# Patient Record
Sex: MALE | Race: WHITE
[De-identification: names, ages, dates, MRNs, and addresses within clinical notes are randomized per-mention and may not be internally consistent; named-entity substitution may affect disease eponyms.]

---

## 2020-03-15 ENCOUNTER — HOSPITAL ENCOUNTER (EMERGENCY)
Dept: HOSPITAL 25 - UCCORT | Age: 75
Discharge: HOME | End: 2020-03-15
Payer: MEDICARE

## 2020-03-15 VITALS — SYSTOLIC BLOOD PRESSURE: 110 MMHG | DIASTOLIC BLOOD PRESSURE: 72 MMHG

## 2020-03-15 DIAGNOSIS — N40.0: ICD-10-CM

## 2020-03-15 DIAGNOSIS — J40: Primary | ICD-10-CM

## 2020-03-15 DIAGNOSIS — Z79.899: ICD-10-CM

## 2020-03-15 PROCEDURE — G0463 HOSPITAL OUTPT CLINIC VISIT: HCPCS

## 2020-03-15 PROCEDURE — 71046 X-RAY EXAM CHEST 2 VIEWS: CPT

## 2020-03-15 PROCEDURE — 99202 OFFICE O/P NEW SF 15 MIN: CPT

## 2020-03-15 NOTE — XMS REPORT
Continuity of Care Document (CCD)

 Created on:2020



Patient:Gaurav Oconnor Jr

Sex:Male

:1945

External Reference #:[id number]





Demographics







 Address  [address] [place] [address]

 

 Home Phone  [phone]

 

 Mobile Phone  [phone]

 

 Preferred Language  en

 

 Marital Status  Not  or 

 

 [employer] Affiliation  Unknown

 

 Race  White

 

 Ethnic Group  Not  or 









Author







 Name  Laboratory (transmitted by agent of provider Ashley Crisostomo)

 

 Address  [address] [place] [address], [address]



   [place] [address]









Care Team Providers







 Name  Role  Phone

 

 ANTONIO Mojica M.D. -  Care Team Information   [phone]
[phone]



 Ophthalmology    

 

 Anais Miranda M.S.F.N.P. - Nurse  Care Team Information   [phone]
[phone]



 Practitioner    

 

 Cordell Payan DO - Urology  Care Team Information   [phone]
[phone]









Problems







 Active Problems  Provider  Date

 

 Benign prostatic hypertrophy without outflow  Kervin Lovell M.D.  Onset: 



 obstruction    

 

 Impaired fasting glycaemia  Kervin Lovell M.D.  Onset: 2015

 

 Mild cognitive disorder  Kervin Lovell M.D.  Onset: 09/10/2019

 

 Screening for malignant neoplasm of prostate  Kervin Lovell M.D.  Onset: 09/10
/2019







Social History







 Type  Date  Description  Comments

 

 Birth Sex    Unknown  

 

 Tobacco Use  Start: Unknown  Never Smoked Cigarettes  

 

 ETOH Use    Occasionally consumes alcohol  

 

 Recreational Drug Use    Denies Drug Use  

 

 Tobacco Use  Start: Unknown  Patient has never smoked  

 

 Smoking Status  Reviewed: 09/10/19  Patient has never smoked  







Allergies, Adverse Reactions, Alerts







 Active Allergies  Reaction  Severity  Comments  Date

 

 NKDA        2015

 

 Ragweed    Moderate  Sneezing, burning eyes, childhoold  2013



       allergy  







Medications







 Active Medications  SIG  Qnty  Indications  Ordering Provider  Date

 

 Prednisone  3 po qd for 2  13tabs  R05  Kervin Lovell,  2020



          10mg Tablets  days, then 2 po      M.D.  



   q for 2 days,        



   then 1 po qd for        



   2 days, then 1/2        



   po q for 2        



   days,off        

 

 Galantamine  1 po bid (dose  60tabs  G31.84  Kervin Lovell,  09/10/2019



 Hydrobromide  increase)      M.D.  



            8mg          



 Tablets          



           

 

 Acidophilus  take 1 tablet by  60caps    Kervin Lovell,  2019



            Capsules  mouth once daily      M.D.  



           

 

 Tamsulosin HCL  1 by mouth every  90caps  N40.0  Kervin Lovell,  2019



              0.4mg  day      M.D.  



 Capsules          



           

 

 Finasteride  Take One Tablet  90tabs  N40.0  Kervin Lovell,  2013



           5mg Tablets  By Mouth Every      M.D.  



   Day        

 

 Vitamin D3  1 po qd      [facility]  10/19/2012



          2000Unit        Osika-Michales,  



 Tablets        R.N., F.N.P.  



           

 

 Multi Vitamin Mens  1 tab PO Q day      [facility]  10/17/2012



         Osika-Michales,  



 Tablets        R.N., F.N.P.  



           

 

 Aspirin  1 po qd  30units    [facility]  10/17/2012



       325mg Chewtabs        Osika-Michales,  



         R.N., F.N.P.  

 

 Melatonin  1 by mouth every      Unknown  



         5mg Capsules  night at bedtime        



   as needed        









 History Medications









 Azithromycin  2 tabs by mouth  6tabs  R05  Nitza Salcido,  10/11/2019 -



          250mg Tablets  x1 day, 1 tab      RN, VIRAJ  2020



   by mouth x4        



   days        







Medications Administered in Office







 Medication  SIG  Qnty  Indications  Ordering Provider  Date

 

 Inj, Kenalog, Triamcinolone        Melecio Quintanilla DO  2016



 Acetonide Per 10 MG, NDC          



 6220-3418-27          



   Injection          

 

 Admin Of Vaccine,One Vaccine        Injection/BP Schedule  10/23/2015



                   Injection          



           

 

 Admin Of Pneumococcal Vaccine        Injection/BP Schedule  2015



                    Injection          



           

 

 Admin Of Pneumococcal Vaccine        Kervin Lovell M.D.  2014



                    Injection          



           

 

 Admin Of Vaccine,One Vaccine        Kervin Lovell M.D.  2009



                   Injection          



           







Immunizations







 CPT Code  Status  Date  Vaccine  Lot #

 

 [id number]  Given  09/10/2019  Fluad 0.5ML, Ages 65Yrs And Older,Flu Vaccine,NDC  
[id number]



       08230-209-63  

 

 [id number]  Given  10/19/2018  Fluad 0.5ML, Ages 65Yrs And Older,Flu Vaccine,NDC  



       97518-899-81  

 

 99370  Given  2017  Flucelvax Quad, 0.5mL, NDC [address]-81  [id number]

 

 69673  Given  11/10/2016  Afluria, 0.5mL Flu Vaccine  

 

 58264  Given  10/23/2015  Tdap, adacel vaccine NDC 49281-400-10 .50ML  [id number]

 

 79789  Given  2015  Prevnar 13, NDC 4558-7459-58. 0.5 ML  

 

 28120  Given  2014  Pneumococcal Vaccine, NDC 3909-7154-52, 0.5 ML  
[id number]

 

 33204  Given  2009  Tetanus&Diptheria Toxoid Immunization(deleted  



       )  







Vital Signs







 Date  Vital  Result  Comment

 

 2020  4:02pm  Weight  176.00 lb  









 Height  67.75 inches  5'7.75"

 

 BP Systolic  140 mmHg  

 

 BP Diastolic  80 mmHg  

 

 Body Temperature  99.9 F  

 

 Heart Rate  80 /min  

 

 Respiratory Rate  18 /min  

 

 BMI (Body Mass Index)  27.0 kg/m2  









 10/11/2019  4:05pm  Weight  173.38 lb  









 Height  67.75 inches  5'7.75"

 

 BP Systolic  124 mmHg  

 

 BP Diastolic  72 mmHg  

 

 Body Temperature  98.3 F  

 

 Heart Rate  64 /min  

 

 O2 % BldC Oximetry  98 %  

 

 BMI (Body Mass Index)  26.6 kg/m2  







Results







 Test  Acquired Date  Facility  Test  Result  H/L  Range  Note

 

 Influenza A And  2020  In Office Labs  Influenza A  POSITIVE    Negative
  



 B (Inoffice)      Rapid        









 Influenza B Rapid Test  NEGATIVE    Negative  







Procedures







 Date  Code  Description  Status

 

 2016  92702619  Colonoscopy  Completed

 

 2005  55252959  Colonoscopy  Completed







Medical Devices







 Description

 

 No Information Available







Encounters







 Type  Date  Location  Provider  Dx  Diagnosis

 

 Office Visit  2020  Main Office  Chronic Care  N40.0  Benign prostatic



   1:00p    Schedule #2    hyperplasia without



           lower urinry tract



           symp









 R73.01  Impaired fasting glucose









 Office Visit  2020  1:00p  Main Office  Chronic Care  N40.0  Benign 
prostatic



       Schedule #2    hyperplasia without



           lower urinry tract



           symp









 R73.01  Impaired fasting glucose









 Office Visit  2020  4:00p  Main Office  Kervin Lovell M.D.  R05  Cough









 R50.9  Fever, unspecified

 

 J10.1  Flu due to oth ident influenza virus w oth resp manifest









 Office Visit  10/21/2019  1:30p  Main Office  Chronic Care  N40.0  Benign 
prostatic



       Schedule #2    hyperplasia without



           lower urinry tract



           symp









 E78.2  Mixed hyperlipidemia

 

 R73.01  Impaired fasting glucose









 Office Visit  10/11/2019  4:00p  Main Office  Nitza Salcido RN, FNP  R05  
Cough









 Z71.3  Dietary counseling and surveillance









 Office Visit  2019  1:30p  Main Office  Chronic Care  N40.0  Benign 
prostatic



       Schedule #2    hyperplasia without



           lower urinry tract



           symp









 R73.01  Impaired fasting glucose

 

 E78.2  Mixed hyperlipidemia







Assessments







 Date  Code  Description  Provider

 

 2020  N40.0  Benign prostatic hyperplasia without lower  Chronic Care 
Schedule #2



     urinary tract sym  

 

 2020  R73.01  Impaired fasting glucose  Chronic Care Schedule #2

 

 2020  N40.0  Benign prostatic hyperplasia without lower  Chronic Care 
Schedule #2



     urinary tract sym  

 

 2020  R73.01  Impaired fasting glucose  Chronic Care Schedule #2

 

 2020  R05  Cough  Laboratory

 

 2020  R05  Ruth Lovell M.D.

 

 2020  R50.9  Fever, unspecified  Laboratory

 

 2020  R50.9  Fever, unspecified  Kervin Lovell M.D.

 

 2020  J10.1  Influenza due to other identified  Laboratory



     influenza virus with other respiratory  



     manifestations  

 

 2020  J10.1  Influenza due to other identified  Kervin Lovell M.D.



     influenza virus with other respiratory  



     manifestations  

 

 10/21/2019  N40.0  Benign prostatic hyperplasia without lower  Chronic Care 
Schedule #2



     urinary tract sym  

 

 10/21/2019  E78.2  Mixed hyperlipidemia  Chronic Care Schedule #2

 

 10/21/2019  R73.01  Impaired fasting glucose  Chronic Care Schedule #2

 

 10/11/2019  R05  Cough  Nitza Salcido RN, FNP

 

 10/11/2019  Z71.3  Dietary counseling and surveillance  Nitza Salcido RN, FRITZP

 

 2019  N40.0  Benign prostatic hyperplasia without lower  Chronic Care 
Schedule #2



     urinary tract sym  

 

 2019  R73.01  Impaired fasting glucose  Chronic Care Schedule #2

 

 2019  E78.2  Mixed hyperlipidemia  Chronic Care Schedule #2







Plan of Treatment

Future Appointment(s):2020  4:30 pm - Kervin Lovell M.D. at Main [id number]  1:00 pm - Chronic Care Schedule #2 at Main Qtcgid99/15/2020  4:30 pm 
- Kervin Lovell M.D. at Main [id number] - Kervin Lovell M.D.R05 
CoughNew Medication:Prednisone 10 mg - 3 po qd for 2 days, then 2 po q for 2 
days, then 1 po qd for 2 days, then 1/2 po q for 2 days,offR50.9 Fever, 
mkczkztmhhbP50.1 Influenza due to other identified influenza virus with other 
respiratory manifestations



Functional Status







 Description

 

 No Information Available







Mental Status







 Description

 

 No Information Available







Referrals







 Description

 

 No Information Available

## 2020-03-15 NOTE — UC
Throat Pain/Nasal Taz HPI





- HPI Summary


HPI Summary: 


74-year-old male comes in with a chief complaint of cough chest congestion and 

fatigue.  Patient reports that he had influenza 70 weeks ago when he was quite 

ill during that period.  Since that time he doesn't feel like he's got back to 

100% of health.  5 days ago patient started feeling ill again and tightness in 

the chest which reminded him of influenza.  He does have some sputum production.





- History of Current Complaint


Chief Complaint: UCRespiratory


Stated Complaint: COUGH, CONGESTION


Time Seen by Provider: 03/15/20 10:07


Pain Intensity: 0





- Allergies/Home Medications


Allergies/Adverse Reactions: 


 Allergies











Allergy/AdvReac Type Severity Reaction Status Date / Time


 


No Known Allergies Allergy   Verified 03/15/20 10:18











Home Medications: 


 Home Medications





Azithromyxin NAVEEN (NF) [Z-Naveen (Zithromax) 250 mg tabs #6] 2 tab PO .TODAY, THEN 

1 DAILY #6 tab 03/15/20 [Rx]


Tamsulosin CAP* [Flomax CAP*] 1 tab DAILY 03/15/20 [History Confirmed 03/15/20]











PMH/Surg Hx/FS Hx/Imm Hx


Previously Healthy: Yes - BPH





- Surgical History


Surgical History: None





- Family History


Known Family History: Positive: Non-Contributory





- Social History


Alcohol Use: Daily


Alcohol Amount: "couple beers every other day"


Substance Use Type: None


Smoking Status (MU): Never Smoked Tobacco





Review of Systems


All Other Systems Reviewed And Are Negative: Yes


Constitutional: Positive: Other - SEE HPI


Skin: Positive: Negative


Eyes: Positive: Negative


ENT: Positive: Nasal Discharge


Respiratory: Positive: Cough, Other - SEE HPI


Cardiovascular: Positive: Other - SEE HPI


Gastrointestinal: Positive: Negative


Motor: Positive: Negative


Neurovascular: Positive: Negative


Musculoskeletal: Positive: Negative


Neurological/Mental Status: Positive: Negative


Psychological: Positive: Negative


Is Patient Immunocompromised?: No





Physical Exam


Triage Information Reviewed: Yes


Appearance: No Pain Distress, Well-Nourished, Ill-Appearing - MILD


Vital Signs: 


 Initial Vital Signs











Temp  97.2 F   03/15/20 10:15


 


Pulse  62   03/15/20 10:15


 


Resp  16   03/15/20 10:15


 


BP  110/72   03/15/20 10:15


 


Pulse Ox  98   03/15/20 10:15











Vital Signs Reviewed: Yes


Eye Exam: Normal


Eyes: Positive: Conjunctiva Clear


ENT: Positive: Pharynx normal, Nasal congestion, TMs normal


Neck: Positive: Supple


Respiratory: Positive: Lungs clear, Normal breath sounds, No respiratory 

distress


Cardiovascular: Positive: RRR


Musculoskeletal: Positive: Strength Intact, ROM Intact


Neurological: Positive: Alert, Muscle Tone Normal


Psychological: Positive: Age Appropriate Behavior


Skin Exam: Normal





Throat Pain/Nasal Course/Dx





- Course


Course Of Treatment: 





: Chase Carrasco, ([id number]) : OWEN (NUANCE) 

Report Date: 03/15/2020 11:03:00 Report Status: Final ==============

================================ Start of Report Content =======================

=======================  Patient Name: PATRICIO KISER Medical Record#: [id number] 

Ordering Physician: Jhonny Warren MD Acct.#: [id number] : 1945 Age

: 74 Sex: M Location: URGENT CARE [facility] Exam Date: 03/15/20 1034 ADM Status

: REG ER Order Information: CHEST PA LAT 2 VWS Accession Number: [id number] CPT

: [id number] INDICATION: Cough and congestion COMPARISON: None TECHNIQUE: PA and 

lateral views of the chest were obtained. FINDINGS: The heart and mediastinum 

are normal in size and contour. The lungs are grossly clear. There is no 

evidence of large pleural effusion. Visualized bones are normal for the patient'

s age. There is no radiographic evidence of free air beneath the diaphragm 

IMPRESSION: No radiographic evidence of acute cardiopulmonary disease. _________

___________________________________________________ <Electronically signed by 

Chase Carrasco MD in OV> 03/15/20 1059 Dictated By: Chase Carrasco MD Dictated Date/

Time: 03/15/20 1058 Transcribed Date/Time: 03/15/20 105 Copy to: CC:Kerivn Lovell MD; Jhonny Warren MD Imaging - [facility] - [facility] 

[facility] Imaging - [facility] [address] [address] [place], [place] [id number] [place] [address] ph [phone]) ph [phone]) ph ([phone])   ====================================

========== End of Report Content ==============================================





I discussed the chest x-ray with the patient.  Patient does have chest 

congestion and infectious symptoms.  At this time it does not appear that his 

chest tightness is due to a cardiac cause.  We discussed antibiotics and the 

role in treatment of infection and the patient prefers to be on an antibiotic 

at this time.  Patient is to follow-up with his primary care doctor tomorrow.  

Patient is to get reevaluated sooner if worse or any questions or concerns.





- Differential Dx/Diagnosis


Provider Diagnosis: 


 Bronchitis








Discharge ED





- Sign-Out/Discharge


Documenting (check all that apply): Patient Departure


All imaging exams completed and their final reports reviewed: Yes





- Discharge Plan


Condition: Stable


Disposition: HOME


Prescriptions: 


Azithromyxin NAVEEN (NF) [Z-Naveen (Zithromax) 250 mg tabs #6] 2 tab PO .TODAY, THEN 

1 DAILY #6 tab


Patient Education Materials:  Acute Bronchitis (ED)


Referrals: 


Nas PENNINGTON,Kervin [Primary Care Provider] - 


Additional Instructions: 


FOLLOW UP WITH YOUR DOCTOR TOMORROW AS SCHEDULED.


GET REEVALUATED SOONER IF NOT IMPROVED OR WORSE; CHEST PAIN, SHORTNESS OF BREATH

, YOU FEEL ILL OR ANY QUESTIONS OR CONCERNS.











- Billing Disposition and Condition


Condition: STABLE


Disposition: Home